# Patient Record
Sex: MALE | Race: WHITE
[De-identification: names, ages, dates, MRNs, and addresses within clinical notes are randomized per-mention and may not be internally consistent; named-entity substitution may affect disease eponyms.]

---
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
## 2024-02-01 ENCOUNTER — TELEPHONE (OUTPATIENT)
Dept: FAMILY MEDICINE CLINIC | Facility: CLINIC | Age: 19
End: 2024-02-01

## 2024-02-01 NOTE — TELEPHONE ENCOUNTER
----- Message from Radha Thakkar DO sent at 1/24/2024 11:03 AM EST -----  I referred him to [facility] last March.  I received a consult note but not the lab results on the genetic testing.  Please contact the office to see if we can get a copy of those results.

## 2024-02-01 NOTE — TELEPHONE ENCOUNTER
Called [facility], spoke to Monika, medical records, she was not able to find lab results.  Spoke to [facility], she will send a message to RN for the request for lab results.   Gave fax number and return contact number for [facility].   [facility]: [phone]

## 2024-02-05 NOTE — TELEPHONE ENCOUNTER
Received results from [facility].  I am unable to read the individual lab results due to being very blurry, poor quality however the note from the genetic counselor states that there was no FBN 1 variant.

## 2024-02-06 NOTE — TELEPHONE ENCOUNTER
Please speak with patient/parent.  The genetic testing results were negative for Marfan's and echocardiogram was negative.  Per the consult note from [facility] it is less likely that he has Marfan's because both of these were normal.  I did previously place a referral to [facility].  On reviewing the providers for Marfan's it does appear that most are to cardiology offices.  I never think a second opinion is a bad idea, we can try different place if they like.  Per [facility] note they were going to see if the insurance would pay for a different type of genetic testing but I cannot see if that was ever approved by the insurance.

## 2024-08-20 ENCOUNTER — TELEPHONE (OUTPATIENT)
Dept: FAMILY MEDICINE CLINIC | Facility: CLINIC | Age: 19
End: 2024-08-20

## 2024-08-20 NOTE — TELEPHONE ENCOUNTER
Ashley from [facility] called and would like a callback in regards to patients office visit notes states she stated she had sent a medical records request and was sent back stating to contact urology. She states she needs pcp ov notes as well cb # [phone] and fax# [phone]

## 2024-08-20 NOTE — TELEPHONE ENCOUNTER
Ashley returned call. Ashley stated they do not have any information on the patient, as he is a new patient.  Advised last visit was March 2023. Ashley stated she has contacted the urology office and the last office note from  will be helpful.  Faxed office note from March 2023

## 2024-08-20 NOTE — TELEPHONE ENCOUNTER
Called [facility], left message that patient has not been seen since March 2023. Advised visit from March 2023 was not for urinary retention.

## 2024-11-22 ENCOUNTER — HOSPITAL ENCOUNTER (EMERGENCY)
Age: 19
Discharge: HOME | End: 2024-11-22
Payer: COMMERCIAL

## 2024-11-22 VITALS
WEIGHT: 140 LBS | HEART RATE: 61 BPM | DIASTOLIC BLOOD PRESSURE: 72 MMHG | OXYGEN SATURATION: 99 % | HEIGHT: 74 IN | SYSTOLIC BLOOD PRESSURE: 119 MMHG | RESPIRATION RATE: 16 BRPM | BODY MASS INDEX: 17.97 KG/M2 | TEMPERATURE: 97.5 F

## 2024-11-22 DIAGNOSIS — R33.9 URINARY RETENTION: Primary | ICD-10-CM

## 2024-11-22 DIAGNOSIS — N35.919 STRICTURE OF MALE URETHRA, UNSPECIFIED STRICTURE TYPE: ICD-10-CM

## 2024-11-22 LAB
ANION GAP SERPL CALC-SCNC: 10 MMOL/L (ref 7–16)
BASOPHILS # BLD: 0 K/UL (ref 0–0.2)
BASOPHILS NFR BLD: 0 % (ref 0–2)
BUN SERPL-MCNC: 17 MG/DL (ref 6–23)
CALCIUM SERPL-MCNC: 10 MG/DL (ref 8.8–10.2)
CHLORIDE SERPL-SCNC: 101 MMOL/L (ref 98–107)
CO2 SERPL-SCNC: 29 MMOL/L (ref 20–29)
CREAT SERPL-MCNC: 0.74 MG/DL (ref 0.8–1.3)
DIFFERENTIAL METHOD BLD: NORMAL
EOSINOPHIL # BLD: 0.1 K/UL (ref 0–0.8)
EOSINOPHIL NFR BLD: 2 % (ref 0.5–7.8)
ERYTHROCYTE [DISTWIDTH] IN BLOOD BY AUTOMATED COUNT: 11.9 % (ref 11.9–14.6)
GLUCOSE SERPL-MCNC: 84 MG/DL (ref 70–99)
HCT VFR BLD AUTO: 44.8 % (ref 41.1–50.3)
HGB BLD-MCNC: 15.4 G/DL (ref 13.6–17.2)
IMM GRANULOCYTES # BLD AUTO: 0 K/UL (ref 0–0.5)
IMM GRANULOCYTES NFR BLD AUTO: 0 % (ref 0–5)
LYMPHOCYTES # BLD: 1 K/UL (ref 0.5–4.6)
LYMPHOCYTES NFR BLD: 17 % (ref 13–44)
MCH RBC QN AUTO: 30.9 PG (ref 26.1–32.9)
MCHC RBC AUTO-ENTMCNC: 34.4 G/DL (ref 31.4–35)
MCV RBC AUTO: 90 FL (ref 82–102)
MONOCYTES # BLD: 0.5 K/UL (ref 0.1–1.3)
MONOCYTES NFR BLD: 8 % (ref 4–12)
NEUTS SEG # BLD: 4.1 K/UL (ref 1.7–8.2)
NEUTS SEG NFR BLD: 73 % (ref 43–78)
NRBC # BLD: 0 K/UL (ref 0–0.2)
PLATELET # BLD AUTO: 217 K/UL (ref 150–450)
PMV BLD AUTO: 9.9 FL (ref 9.4–12.3)
POTASSIUM SERPL-SCNC: 3.8 MMOL/L (ref 3.5–5.1)
RBC # BLD AUTO: 4.98 M/UL (ref 4.23–5.6)
SODIUM SERPL-SCNC: 140 MMOL/L (ref 136–145)
WBC # BLD AUTO: 5.7 K/UL (ref 4.3–11.1)

## 2024-11-22 PROCEDURE — 80048 BASIC METABOLIC PNL TOTAL CA: CPT

## 2024-11-22 PROCEDURE — 85025 COMPLETE CBC W/AUTO DIFF WBC: CPT

## 2024-11-22 PROCEDURE — 99283 EMERGENCY DEPT VISIT LOW MDM: CPT

## 2024-11-22 PROCEDURE — 52281 CYSTOSCOPY AND TREATMENT: CPT | Performed by: UROLOGY

## 2024-11-22 PROCEDURE — 51702 INSERT TEMP BLADDER CATH: CPT

## 2024-11-22 RX ORDER — LIDOCAINE HYDROCHLORIDE 20 MG/ML
JELLY TOPICAL
Status: DISCONTINUED | OUTPATIENT
Start: 2024-11-22 | End: 2024-11-22 | Stop reason: HOSPADM

## 2024-11-22 ASSESSMENT — PAIN - FUNCTIONAL ASSESSMENT: PAIN_FUNCTIONAL_ASSESSMENT: NONE - DENIES PAIN

## 2024-11-22 ASSESSMENT — LIFESTYLE VARIABLES
HOW MANY STANDARD DRINKS CONTAINING ALCOHOL DO YOU HAVE ON A TYPICAL DAY: PATIENT DOES NOT DRINK
HOW OFTEN DO YOU HAVE A DRINK CONTAINING ALCOHOL: NEVER

## 2024-11-22 NOTE — OP NOTE
[address]  [address]                            OPERATIVE REPORT      PATIENT NAME: KETAN GALINDO                : 2005  MED REC NO: [id number]                       ROOM: 11  ACCOUNT NO: [id number]                       ADMIT DATE: 2024  PROVIDER: Juan Moore DO    DATE OF SERVICE:  2024    PREOPERATIVE DIAGNOSES:  Urinary retention with history of bulbar urethral stricture.    POSTOPERATIVE DIAGNOSES:  Urinary retention with history of bulbar urethral stricture.    PROCEDURES PERFORMED:  Cystoscopy, urethral dilation, and difficult Galindo catheter placement.    SURGEON:  Juan Moore DO    ASSISTANT:  None.    ANESTHESIA:  Local.    ESTIMATED BLOOD LOSS:  None.    SPECIMENS REMOVED:  None.    INTRAOPERATIVE FINDINGS:  Please see dictated operative note.     COMPLICATIONS:  None immediate.    IMPLANTS:  None.    INDICATIONS:  This is a 19-year-old gentleman who reports a history of bulbar urethral stricture.  He has undergone multiple dilations in the past and presents to the emergency room today with complaints of urinary retention.  Attempts at catheter placement by the nursing staff have been unsuccessful.  All risks, benefits, and alternatives to the procedure have been discussed and he is willing to proceed at this time.    DESCRIPTION OF PROCEDURE:  The patient's genital area was prepped and draped and a sterile field applied.  2% viscous lidocaine jelly was injected into urethra and allowed to dwell for several minutes.  The flexible cystoscope was then passed into urethra and advanced under direct vision.  A tight bulbar urethral stricture was noted.  There was some mild stricture disease just proximal to this area of interest.  However, I was able to pass the scope through this disease until reaching the tight bulbar urethral stricture.  A guidewire was passed through the true lumen of the urethra

## 2024-11-22 NOTE — ED TRIAGE NOTES
Pt c/o urinary retention. Hx of stricture. Pt states he has not been able to void for 2 hours. Denies dysuria or hematuria. Denies nausea or vomiting. Pt sees Dr. Diaz with Urology. Discussed surgery prior with urology.

## 2024-11-22 NOTE — ED PROVIDER NOTES
Emergency Department Provider Note       PCP: Radha Thakkar DO   Age: 19 y.o.   Sex: male     DISPOSITION Decision To Discharge 11/22/2024 02:32:58 PM            ICD-10-CM    1. Urinary retention  R33.9       2. Stricture of male urethra, unspecified stricture type  N35.919           Medical Decision Making     Galindo catheter was placed by urology.  Recommended discharge home.  Discussed aftercare with patient and patient family they verbalized understanding.  Will discharge home.     1 acute illness with systemic symptoms.  Over the counter drug management performed.  Patient was discharged risks and benefits of hospitalization were considered.  Shared medical decision making was utilized in creating the patients health plan today.  I independently ordered and reviewed each unique test.    History     Patient is a 18yo male with CC of concern for urethral stricture. Has known stricture that caused acute retention 1 year ago. Was eventually cathed by urologist after difficulty in the ED. He notes that he followed up with urology and was seemingly OK however about 3 months ago had another stricture when up in [place]. He feels that he is able to still empty bladder however notes worsening since around 2 hours ago. He has a follow up appointment with urology scheduled for december 10th.    The history is provided by the patient. No  was used.     Physical Exam     Vitals signs and nursing note reviewed:  Vitals:    11/22/24 1128 11/22/24 1130 11/22/24 1131   BP:  119/72    Pulse:  61    Resp:   16   Temp:  97.5 °F (36.4 °C)    SpO2:  99%    Weight: 63.5 kg (140 lb)     Height: 1.88 m (6' 2\")        Physical Exam  Vitals and nursing note reviewed.   Constitutional:       General: He is not in acute distress.     Appearance: Normal appearance. He is not toxic-appearing.   HENT:      Head: Normocephalic and atraumatic.   Cardiovascular:      Rate and Rhythm: Normal rate.   Pulmonary:     of 11/22/2024  2:34 PM        CONTINUE these medications which have NOT CHANGED    Details   tamsulosin (FLOMAX) 0.4 MG capsule Take 1 capsule by mouth daily for 10 days, Disp-10 capsule, R-0Normal              Results for orders placed or performed during the hospital encounter of 11/22/24   CBC with Auto Differential   Result Value Ref Range    WBC 5.7 4.3 - 11.1 K/uL    RBC 4.98 4.23 - 5.6 M/uL    Hemoglobin 15.4 13.6 - 17.2 g/dL    Hematocrit 44.8 41.1 - 50.3 %    MCV 90.0 82 - 102 FL    MCH 30.9 26.1 - 32.9 PG    MCHC 34.4 31.4 - 35.0 g/dL    RDW 11.9 11.9 - 14.6 %    Platelets 217 150 - 450 K/uL    MPV 9.9 9.4 - 12.3 FL    nRBC 0.00 0.0 - 0.2 K/uL    Differential Type AUTOMATED      Neutrophils % 73 43 - 78 %    Lymphocytes % 17 13 - 44 %    Monocytes % 8 4.0 - 12.0 %    Eosinophils % 2 0.5 - 7.8 %    Basophils % 0 0.0 - 2.0 %    Immature Granulocytes % 0 0.0 - 5.0 %    Neutrophils Absolute 4.1 1.7 - 8.2 K/UL    Lymphocytes Absolute 1.0 0.5 - 4.6 K/UL    Monocytes Absolute 0.5 0.1 - 1.3 K/UL    Eosinophils Absolute 0.1 0.0 - 0.8 K/UL    Basophils Absolute 0.0 0.0 - 0.2 K/UL    Immature Granulocytes Absolute 0.0 0.0 - 0.5 K/UL   Basic Metabolic Panel   Result Value Ref Range    Sodium 140 136 - 145 mmol/L    Potassium 3.8 3.5 - 5.1 mmol/L    Chloride 101 98 - 107 mmol/L    CO2 29 20 - 29 mmol/L    Anion Gap 10 7 - 16 mmol/L    Glucose 84 70 - 99 mg/dL    BUN 17 6 - 23 MG/DL    Creatinine 0.74 (L) 0.80 - 1.30 MG/DL    Est, Glom Filt Rate >90 >60 ml/min/1.73m2    Calcium 10.0 8.8 - 10.2 MG/DL         No orders to display                No results for input(s): \"COVID19\" in the last 72 hours.     Voice dictation software was used during the making of this note.  This software is not perfect and grammatical and other typographical errors may be present.  This note has not been completely proofread for errors.     Jonh Ireland PA  11/23/24 0515

## 2025-04-17 ENCOUNTER — OFFICE VISIT (OUTPATIENT)
Dept: FAMILY MEDICINE CLINIC | Facility: CLINIC | Age: 20
End: 2025-04-17
Payer: COMMERCIAL

## 2025-04-17 VITALS
RESPIRATION RATE: 16 BRPM | SYSTOLIC BLOOD PRESSURE: 110 MMHG | WEIGHT: 147 LBS | HEART RATE: 90 BPM | OXYGEN SATURATION: 99 % | DIASTOLIC BLOOD PRESSURE: 66 MMHG | BODY MASS INDEX: 18.87 KG/M2 | HEIGHT: 74 IN

## 2025-04-17 DIAGNOSIS — Z00.00 WELL ADULT EXAM: Primary | ICD-10-CM

## 2025-04-17 DIAGNOSIS — L20.84 INTRINSIC ECZEMA: ICD-10-CM

## 2025-04-17 PROCEDURE — 99395 PREV VISIT EST AGE 18-39: CPT | Performed by: FAMILY MEDICINE

## 2025-04-17 SDOH — ECONOMIC STABILITY: FOOD INSECURITY: WITHIN THE PAST 12 MONTHS, THE FOOD YOU BOUGHT JUST DIDN'T LAST AND YOU DIDN'T HAVE MONEY TO GET MORE.: NEVER TRUE

## 2025-04-17 SDOH — ECONOMIC STABILITY: FOOD INSECURITY: WITHIN THE PAST 12 MONTHS, YOU WORRIED THAT YOUR FOOD WOULD RUN OUT BEFORE YOU GOT MONEY TO BUY MORE.: NEVER TRUE

## 2025-04-17 ASSESSMENT — PATIENT HEALTH QUESTIONNAIRE - PHQ9
SUM OF ALL RESPONSES TO PHQ QUESTIONS 1-9: 0
1. LITTLE INTEREST OR PLEASURE IN DOING THINGS: NOT AT ALL
SUM OF ALL RESPONSES TO PHQ QUESTIONS 1-9: 0
SUM OF ALL RESPONSES TO PHQ QUESTIONS 1-9: 0
2. FEELING DOWN, DEPRESSED OR HOPELESS: NOT AT ALL
SUM OF ALL RESPONSES TO PHQ QUESTIONS 1-9: 0

## 2025-04-17 ASSESSMENT — ENCOUNTER SYMPTOMS
VOMITING: 0
BLOOD IN STOOL: 0
NAUSEA: 0
COUGH: 0
ABDOMINAL PAIN: 0
SHORTNESS OF BREATH: 0
WHEEZING: 0

## 2025-04-17 NOTE — PROGRESS NOTES
canal and external ear normal.      Left Ear: Tympanic membrane, ear canal and external ear normal.      Nose: No rhinorrhea.      Mouth/Throat:      Mouth: Mucous membranes are moist.      Pharynx: No oropharyngeal exudate.      Tonsils: No tonsillar exudate.   Cardiovascular:      Rate and Rhythm: Normal rate and regular rhythm.      Heart sounds: No murmur heard.  Pulmonary:      Effort: Pulmonary effort is normal. No respiratory distress.      Breath sounds: No wheezing.   Abdominal:      General: There is no distension.      Palpations: There is no mass.      Tenderness: There is no abdominal tenderness. There is no guarding.      Hernia: No hernia is present.   Musculoskeletal:      Cervical back: Neck supple.      Right lower leg: No edema.      Left lower leg: No edema.   Lymphadenopathy:      Cervical: No cervical adenopathy.   Skin:     General: Skin is warm and dry.      Findings: No rash.      Comments: Dry scaly patch on right patella   Neurological:      Mental Status: He is alert.   Psychiatric:         Mood and Affect: Mood normal.         Behavior: Behavior normal.               Radha Thakkar DO    The patient (or guardian, if applicable) and other individuals in attendance with the patient were advised that Artificial Intelligence will be utilized during this visit to record, process the conversation to generate a clinical note, and support improvement of the AI technology. The patient (or guardian, if applicable) and other individuals in attendance at the appointment consented to the use of AI, including the recording.